# Patient Record
Sex: FEMALE | Race: BLACK OR AFRICAN AMERICAN | NOT HISPANIC OR LATINO | Employment: UNEMPLOYED | URBAN - METROPOLITAN AREA
[De-identification: names, ages, dates, MRNs, and addresses within clinical notes are randomized per-mention and may not be internally consistent; named-entity substitution may affect disease eponyms.]

---

## 2020-11-30 ENCOUNTER — APPOINTMENT (OUTPATIENT)
Dept: GENERAL RADIOLOGY | Facility: CLINIC | Age: 50
End: 2020-11-30
Attending: EMERGENCY MEDICINE
Payer: COMMERCIAL

## 2020-11-30 ENCOUNTER — HOSPITAL ENCOUNTER (EMERGENCY)
Facility: CLINIC | Age: 50
Discharge: HOME | End: 2020-12-01
Attending: EMERGENCY MEDICINE | Admitting: EMERGENCY MEDICINE
Payer: COMMERCIAL

## 2020-11-30 VITALS
WEIGHT: 220 LBS | DIASTOLIC BLOOD PRESSURE: 84 MMHG | TEMPERATURE: 95.4 F | HEART RATE: 114 BPM | SYSTOLIC BLOOD PRESSURE: 140 MMHG | RESPIRATION RATE: 16 BRPM | OXYGEN SATURATION: 100 %
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
## 2020-11-30 DIAGNOSIS — T74.91XA DOMESTIC VIOLENCE OF ADULT, INITIAL ENCOUNTER: ICD-10-CM

## 2020-11-30 DIAGNOSIS — S80.12XA CONTUSION OF LEFT LOWER LEG, INITIAL ENCOUNTER: ICD-10-CM

## 2020-11-30 DIAGNOSIS — M79.662 PAIN OF LEFT LOWER LEG: ICD-10-CM

## 2020-11-30 PROCEDURE — 250N000013 HC RX MED GY IP 250 OP 250 PS 637: Performed by: EMERGENCY MEDICINE

## 2020-11-30 PROCEDURE — 99283 EMERGENCY DEPT VISIT LOW MDM: CPT | Performed by: EMERGENCY MEDICINE

## 2020-11-30 PROCEDURE — 73590 X-RAY EXAM OF LOWER LEG: CPT | Mod: LT

## 2020-11-30 RX ORDER — DOCOSANOL 100 MG/G
CREAM TOPICAL
Status: DISCONTINUED | OUTPATIENT
Start: 2020-11-30 | End: 2020-12-01 | Stop reason: HOSPADM

## 2020-11-30 RX ADMIN — DOCOSANOL: 100 CREAM TOPICAL at 22:05

## 2020-11-30 NOTE — ED AVS SNAPSHOT
[facility] Emergency Department  [address]  [place] [address]  Phone: [phone]  Fax: [phone]                                    David Brock   MRN: [id number]    Department: [facility] Emergency Department   Date of Visit: 11/30/2020           After Visit Summary Signature Page    I have received my discharge instructions, and my questions have been answered. I have discussed any challenges I see with this plan with the nurse or doctor.    ..........................................................................................................................................  Patient/Patient Representative Signature      ..........................................................................................................................................  Patient Representative Print Name and Relationship to Patient    ..................................................               ................................................  Date                                   Time    ..........................................................................................................................................  Reviewed by Signature/Title    ...................................................              ..............................................  Date                                               Time          22EPIC Rev 08/18
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
## 2021-02-12 ENCOUNTER — APPOINTMENT (OUTPATIENT)
Dept: GENERAL RADIOLOGY | Facility: CLINIC | Age: 51
End: 2021-02-12
Attending: EMERGENCY MEDICINE
Payer: COMMERCIAL

## 2021-02-12 ENCOUNTER — HOSPITAL ENCOUNTER (EMERGENCY)
Facility: CLINIC | Age: 51
Discharge: HOME | End: 2021-02-12
Attending: EMERGENCY MEDICINE | Admitting: EMERGENCY MEDICINE
Payer: COMMERCIAL

## 2021-02-12 VITALS
DIASTOLIC BLOOD PRESSURE: 88 MMHG | HEART RATE: 96 BPM | OXYGEN SATURATION: 100 % | RESPIRATION RATE: 18 BRPM | TEMPERATURE: 97.9 F | SYSTOLIC BLOOD PRESSURE: 141 MMHG

## 2021-02-12 DIAGNOSIS — T74.91XA DOMESTIC VIOLENCE OF ADULT, INITIAL ENCOUNTER: ICD-10-CM

## 2021-02-12 DIAGNOSIS — S80.02XA CONTUSION OF LEFT KNEE, INITIAL ENCOUNTER: ICD-10-CM

## 2021-02-12 PROCEDURE — 250N000013 HC RX MED GY IP 250 OP 250 PS 637: Performed by: EMERGENCY MEDICINE

## 2021-02-12 PROCEDURE — 99283 EMERGENCY DEPT VISIT LOW MDM: CPT

## 2021-02-12 PROCEDURE — 73562 X-RAY EXAM OF KNEE 3: CPT | Mod: LT

## 2021-02-12 RX ORDER — IBUPROFEN 600 MG/1
600 TABLET, FILM COATED ORAL ONCE
Status: COMPLETED | OUTPATIENT
Start: 2021-02-12 | End: 2021-02-12

## 2021-02-12 RX ADMIN — IBUPROFEN 600 MG: 600 TABLET, FILM COATED ORAL at 01:14

## 2021-02-12 ASSESSMENT — ENCOUNTER SYMPTOMS: MYALGIAS: 1
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
## 2023-12-07 ENCOUNTER — HOSPITAL ENCOUNTER (EMERGENCY)
Facility: CLINIC | Age: 53
Discharge: HOME | End: 2023-12-07
Attending: EMERGENCY MEDICINE | Admitting: EMERGENCY MEDICINE

## 2023-12-07 VITALS
OXYGEN SATURATION: 100 % | TEMPERATURE: 98.3 F | RESPIRATION RATE: 18 BRPM | DIASTOLIC BLOOD PRESSURE: 108 MMHG | HEART RATE: 90 BPM | SYSTOLIC BLOOD PRESSURE: 155 MMHG

## 2023-12-07 DIAGNOSIS — T78.40XA ALLERGIC REACTION, INITIAL ENCOUNTER: ICD-10-CM

## 2023-12-07 PROCEDURE — 99284 EMERGENCY DEPT VISIT MOD MDM: CPT

## 2023-12-07 PROCEDURE — 250N000013 HC RX MED GY IP 250 OP 250 PS 637: Performed by: EMERGENCY MEDICINE

## 2023-12-07 RX ORDER — FAMOTIDINE 40 MG/1
40 TABLET, FILM COATED ORAL DAILY
Qty: 4 TABLET | Refills: 0 | Status: SHIPPED | OUTPATIENT
Start: 2023-12-08 | End: 2023-12-12

## 2023-12-07 RX ORDER — DIPHENHYDRAMINE HCL 25 MG
25 TABLET ORAL EVERY 6 HOURS PRN
Qty: 16 TABLET | Refills: 0 | Status: SHIPPED | OUTPATIENT
Start: 2023-12-08 | End: 2023-12-12

## 2023-12-07 RX ORDER — DIPHENHYDRAMINE HCL 25 MG
50 CAPSULE ORAL ONCE
Status: COMPLETED | OUTPATIENT
Start: 2023-12-07 | End: 2023-12-07

## 2023-12-07 RX ORDER — PREDNISONE 20 MG/1
TABLET ORAL
Qty: 10 TABLET | Refills: 0 | Status: SHIPPED | OUTPATIENT
Start: 2023-12-08

## 2023-12-07 RX ORDER — FAMOTIDINE 20 MG/1
20 TABLET, FILM COATED ORAL ONCE
Status: COMPLETED | OUTPATIENT
Start: 2023-12-07 | End: 2023-12-07

## 2023-12-07 RX ADMIN — FAMOTIDINE 20 MG: 20 TABLET ORAL at 19:41

## 2023-12-07 RX ADMIN — DIPHENHYDRAMINE HYDROCHLORIDE 50 MG: 25 CAPSULE ORAL at 19:41

## 2023-12-07 RX ADMIN — Medication 10 MG: at 19:42

## 2023-12-07 ASSESSMENT — ACTIVITIES OF DAILY LIVING (ADL)
ADLS_ACUITY_SCORE: 33
ADLS_ACUITY_SCORE: 33

## 2023-12-07 NOTE — ED TRIAGE NOTES
"Pt here today for evaluation of swelling on forehead and under eye. Pt reports that this started 2 days ago and she feels like the swelling won't go away. Pt states she has not tried eating anything new or using new products. Pt states that she did dye her hair recently and is unsure if it is related. Pt states she tried icing but the swelling just won't \"budge\". Pt denies any throat swelling or trouble with swallowing or breathing. Pt states, \"I just need something to get rid of this swelling.\".     Triage Assessment (Adult)       Row Name 12/07/23 3397          Triage Assessment    Airway WDL WDL        Respiratory WDL    Respiratory WDL WDL        Cardiac WDL    Cardiac WDL WDL        Cognitive/Neuro/Behavioral WDL    Cognitive/Neuro/Behavioral WDL WDL                     "

## 2023-12-08 NOTE — ED PROVIDER NOTES
History     Chief Complaint:  Facial swelling     The history is provided by the patient.      Alisia Brock is a 53 year old female who presents for facial swelling. Patient says that she developed pain and swelling under her right eye after using a new hair dye. She denies any known allergies and has never experienced this before. Patient denies any new medications, dietary changes, or travel. No difficulty breathing, swallowing, rash, fever, chills, nausea, vomiting, or vision changes. The swelling does not radiate to the back of her head.       Independent Historian:    None- patient only     Review of External Notes:  None       Medications:    Patient denies currently taking any daily medications     Past Medical History:    History reviewed.  No pertinent past medical history     Physical Exam   Patient Vitals for the past 24 hrs:   BP Temp Temp src Pulse Resp SpO2   12/07/23 1730 (!) 155/108 98.3  F (36.8  C) Temporal 90 18 100 %        Physical Exam  General: No acute distress.  Head: No obvious trauma to head.  Eyes:  Conjunctivae clear.   Neck: Normal range of motion.   CV: Skin is well perfused, no cyanosis  Respiratory: Effort normal. No audible wheezing.  Gastrointestinal: Non-distended.  Musculoskeletal: Normal range of motion. No gross deformities.  Neuro: Alert. Moving all extremities appropriately.  Normal speech.    Skin: Scalp, forehead, and bilateral temples are swollen. No lesions. Skin is in tact. No erythema.     Emergency Department Course   Emergency Department Course & Assessments:     Interventions:  Medications   diphenhydrAMINE (BENADRYL) capsule 50 mg (50 mg Oral $Given 12/7/23 1941)   famotidine (PEPCID) tablet 20 mg (20 mg Oral $Given 12/7/23 1941)   dexAMETHasone (DECADRON) alcohol-free oral solution 10 mg (10 mg Oral $Given 12/7/23 1942)        Assessments:  1922 I obtained history and examined the patient as noted above.   2053 I rechecked on the patient and the swelling  has improved. At this point I feel that the patient is safe for discharge, and the patient agrees.     Independent Interpretation (X-rays, CTs, rhythm strip):  None    Consultations/Discussion of Management or Tests:  None       Social Determinants of Health affecting care:  None      Disposition:  The patient was discharged to home.     Impression & Plan    Medical Decision Making:  Patient presents with concern for allergic reaction.  She is able to breathe comfortably, tolerating her secretions, and has no nausea or vomiting.  There is no concern for anaphylaxis.  She does have swelling of the forehead, and there is concern that the hair dye she used is the culprit for her allergic reaction.  She was given famotidine, Benadryl and Decadron.  Upon reevaluation, it appears the swelling is improved.  She reports she is feeling better.  She is given a prescription for Benadryl, Pepcid and prednisone.  Return precautions are given and she verbalizes understanding.  She is discharged home in stable condition.    Diagnosis:    ICD-10-CM    1. Allergic reaction, initial encounter  T78.40XA            Discharge Medications:  New Prescriptions    DIPHENHYDRAMINE (BENADRYL) 25 MG TABLET    Take 1 tablet (25 mg) by mouth every 6 hours as needed for itching or allergies    FAMOTIDINE (PEPCID) 40 MG TABLET    Take 1 tablet (40 mg) by mouth daily for 4 days    PREDNISONE (DELTASONE) 20 MG TABLET    Take two tablets (= 40mg) each day for 5 (five) days      Scribe Disclosure:  JOSÉ MIGUEL, Lucia Najera, am serving as a scribe at 7:28 PM on 12/7/2023 to document services personally performed by Clifton Bailon MD based on my observations and the provider's statements to me.    12/7/2023   Clifton Bailon MD Peery, Stephen, MD  12/11/23 4688
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
## 2023-12-16 ENCOUNTER — APPOINTMENT (OUTPATIENT)
Dept: GENERAL RADIOLOGY | Facility: CLINIC | Age: 53
End: 2023-12-16
Attending: EMERGENCY MEDICINE

## 2023-12-16 ENCOUNTER — HOSPITAL ENCOUNTER (EMERGENCY)
Facility: CLINIC | Age: 53
Discharge: HOME | End: 2023-12-16
Attending: EMERGENCY MEDICINE | Admitting: EMERGENCY MEDICINE

## 2023-12-16 VITALS
RESPIRATION RATE: 18 BRPM | HEART RATE: 97 BPM | SYSTOLIC BLOOD PRESSURE: 105 MMHG | DIASTOLIC BLOOD PRESSURE: 72 MMHG | TEMPERATURE: 98.9 F | OXYGEN SATURATION: 99 %

## 2023-12-16 DIAGNOSIS — J18.9 PNEUMONIA DUE TO INFECTIOUS ORGANISM, UNSPECIFIED LATERALITY, UNSPECIFIED PART OF LUNG: ICD-10-CM

## 2023-12-16 DIAGNOSIS — J10.1 INFLUENZA A: ICD-10-CM

## 2023-12-16 LAB
FLUAV RNA SPEC QL NAA+PROBE: POSITIVE
FLUBV RNA RESP QL NAA+PROBE: NEGATIVE
RSV RNA SPEC NAA+PROBE: NEGATIVE
SARS-COV-2 RNA RESP QL NAA+PROBE: NEGATIVE

## 2023-12-16 PROCEDURE — 87637 SARSCOV2&INF A&B&RSV AMP PRB: CPT | Performed by: EMERGENCY MEDICINE

## 2023-12-16 PROCEDURE — 71046 X-RAY EXAM CHEST 2 VIEWS: CPT

## 2023-12-16 PROCEDURE — 99284 EMERGENCY DEPT VISIT MOD MDM: CPT | Mod: 25

## 2023-12-16 PROCEDURE — 250N000013 HC RX MED GY IP 250 OP 250 PS 637: Performed by: EMERGENCY MEDICINE

## 2023-12-16 RX ORDER — ACETAMINOPHEN 325 MG/1
975 TABLET ORAL ONCE
Status: COMPLETED | OUTPATIENT
Start: 2023-12-16 | End: 2023-12-16

## 2023-12-16 RX ORDER — DOXYCYCLINE HYCLATE 100 MG
100 TABLET ORAL 2 TIMES DAILY
Qty: 14 TABLET | Refills: 0 | Status: SHIPPED | OUTPATIENT
Start: 2023-12-16 | End: 2023-12-23

## 2023-12-16 RX ORDER — IBUPROFEN 600 MG/1
600 TABLET, FILM COATED ORAL ONCE
Status: COMPLETED | OUTPATIENT
Start: 2023-12-16 | End: 2023-12-16

## 2023-12-16 RX ADMIN — IBUPROFEN 600 MG: 600 TABLET, FILM COATED ORAL at 13:53

## 2023-12-16 RX ADMIN — ACETAMINOPHEN 975 MG: 325 TABLET, FILM COATED ORAL at 13:53

## 2023-12-16 ASSESSMENT — ACTIVITIES OF DAILY LIVING (ADL): ADLS_ACUITY_SCORE: 35
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
## 2023-12-16 NOTE — ED TRIAGE NOTES
"Patient arrives via EMS and reports 2 days of cough, fatigue, generalized body aches, and feeling \"unwell\".  Pt denies medical conditions or daily medications.  ABCs intact, A&Ox4.     Triage Assessment (Adult)       Row Name 12/16/23 1128          Triage Assessment    Airway WDL WDL        Respiratory WDL    Respiratory WDL WDL        Skin Circulation/Temperature WDL    Skin Circulation/Temperature WDL WDL        Cardiac WDL    Cardiac WDL WDL        Peripheral/Neurovascular WDL    Peripheral Neurovascular WDL WDL        Cognitive/Neuro/Behavioral WDL    Cognitive/Neuro/Behavioral WDL WDL                     "

## 2023-12-16 NOTE — ED NOTES
Patient laying on floor refusing to get up.  2 RN's explained pt needed to sit up in a chair.  She refused to get up.  RN informed that she could not continue to lay on the floor.  Pt independently got herself up to the wheelchair with no assistance from staff.

## 2023-12-16 NOTE — CONSULTS
Care Management     Length of Stay (days): 0    Expected Discharge Date:  12/16/2023     Concerns to be Addressed: Discharge   Patient plan of care discussed at interdisciplinary rounds: Discussed with Dr. Davenport and RN    Anticipated Discharge Disposition: Home      Additional Information: Alisia is a 53-year-old women who presented to the ED with influenza. SW was called to assist with discharge. Patient was medically cleared but refusing to leave and laying on the floor in the ED. Staff members offered bus tokens but patient refused. SW went to check in with the patient who said she was too sick to take the bus. She said she did not have any friends or family who could provide transportation. She said she did not have the means to pay for a taxi. Patient signed waiver and social work arranged taxi transportation in order to ensure a safe discharge home.       BAYRON Rodriguez, [facility]   Care Coordinator-Casual  [email or web address]

## 2023-12-16 NOTE — DISCHARGE INSTRUCTIONS
Push fluids, rest  Motrin 600mg every 6 hours  Tylenol every 8 hours  Start antibiotic today  Return if trouble breathing